# Patient Record
Sex: FEMALE | Race: WHITE | Employment: FULL TIME | URBAN - METROPOLITAN AREA
[De-identification: names, ages, dates, MRNs, and addresses within clinical notes are randomized per-mention and may not be internally consistent; named-entity substitution may affect disease eponyms.]

---

## 2017-02-22 ENCOUNTER — MYC REFILL (OUTPATIENT)
Dept: FAMILY MEDICINE | Facility: CLINIC | Age: 27
End: 2017-02-22

## 2017-02-22 DIAGNOSIS — Z79.899 CONTROLLED SUBSTANCE AGREEMENT SIGNED: ICD-10-CM

## 2017-02-22 DIAGNOSIS — F98.8 ATTENTION DEFICIT DISORDER: ICD-10-CM

## 2017-02-22 NOTE — TELEPHONE ENCOUNTER
Message from [facility]:  Original authorizing provider: ROMY Larsen CNP would like a refill of the following medications:  methylphenidate ER (CONCERTA) 27 MG CR tablet [ROMY Larsen CNP]    Preferred pharmacy: [facility] [place]  [place] - [address]    Comment:  Hi - I will be home in [place] Friday 2/24 through Tuesday 2/28. I am requesting for a refill for my methylphenidate so I can pick it up (hoping Monday 2/27). This is a prescription that Julianne Samayoa has to approve before it can be refilled. Let me know if this is possible and if you have any questions. Thank you! Yancy

## 2017-02-22 NOTE — TELEPHONE ENCOUNTER
Controlled Substance Refill Request for methylphenidate ER 27 mg daily  Problem List Complete:  No     PROVIDER TO CONSIDER COMPLETION OF PROBLEM LIST AND OVERVIEW/CONTROLLED SUBSTANCE AGREEMENT    Last Written Prescription Date:  12/15/16  Last Fill Quantity: 30,   # refills: 0    Last Office Visit with [facility] care provider: 6/23/16    Future Office visit:     Controlled substance agreement on file: Yes:  Date 12/17/15.     Processing:  Staff will hand deliver Rx to on-site pharmacy. Please notify the patient through My Chart when delivered to [facility].   checked in past 6 months?  No, route to RN   [place] reviewed:  reviewed- no concerns. Place in Hailo's basket.    [place]  [email or web address]

## 2017-02-23 RX ORDER — METHYLPHENIDATE HYDROCHLORIDE 27 MG/1
27 TABLET ORAL EVERY MORNING
Qty: 30 TABLET | Refills: 0 | Status: SHIPPED | OUTPATIENT
Start: 2017-02-23 | End: 2017-05-01

## 2017-03-27 DIAGNOSIS — E03.8 OTHER SPECIFIED HYPOTHYROIDISM: ICD-10-CM

## 2017-03-28 RX ORDER — LEVOTHYROXINE SODIUM 150 UG/1
TABLET ORAL
Qty: 90 TABLET | Refills: 1 | Status: SHIPPED | OUTPATIENT
Start: 2017-03-28 | End: 2017-09-06

## 2017-03-28 RX ORDER — NORGESTIMATE AND ETHINYL ESTRADIOL 7DAYSX3 28
KIT ORAL
Qty: 84 TABLET | Refills: 1 | Status: SHIPPED | OUTPATIENT
Start: 2017-03-28 | End: 2017-08-28

## 2017-03-28 NOTE — TELEPHONE ENCOUNTER
Refill approved through [facility] protocol.  Ilene Syed RN  [facility]  [phone]

## 2017-03-28 NOTE — TELEPHONE ENCOUNTER
LEVOTHYROXINE 0.150MG (150MCG) TAB     Last Written Prescription Date: 06/23/2016  Last Quantity: 90, # refills: 3  Last Office Visit with [facility] or [facility] prescribing provider: 06/23/2016        TSH   Date Value Ref Range Status   06/23/2016 3.65 0.40 - 4.00 mU/L Final             TRI-SPRINTEC TABLETS 28      Last Written Prescription Date: 10/17/2016  Last Fill Quantity: 84, # refills: 1  Last Office Visit with [facility], [facility] or [facility] prescribing provider: 06/23/2016       BP Readings from Last 3 Encounters:   06/23/16 101/68   07/01/15 93/53   09/22/14 111/73     Date of last Breast Exam:

## 2017-05-01 ENCOUNTER — MYC REFILL (OUTPATIENT)
Dept: FAMILY MEDICINE | Facility: CLINIC | Age: 27
End: 2017-05-01

## 2017-05-01 DIAGNOSIS — Z79.899 CONTROLLED SUBSTANCE AGREEMENT SIGNED: ICD-10-CM

## 2017-05-01 DIAGNOSIS — F98.8 ATTENTION DEFICIT DISORDER: ICD-10-CM

## 2017-05-01 RX ORDER — METHYLPHENIDATE HYDROCHLORIDE 27 MG/1
27 TABLET ORAL EVERY MORNING
Qty: 30 TABLET | Refills: 0 | Status: SHIPPED | OUTPATIENT
Start: 2017-05-01 | End: 2017-06-29

## 2017-05-01 NOTE — TELEPHONE ENCOUNTER
Controlled Substance Refill Request for Concerta 27 mg  Problem List Complete:  No     PROVIDER TO CONSIDER COMPLETION OF PROBLEM LIST AND OVERVIEW/CONTROLLED SUBSTANCE AGREEMENT    Last Written Prescription Date:  2/23/17  Last Fill Quantity: 30,   # refills: 0    Last Office Visit with [facility] primary care provider: 6/23/16    Future Office visit:     Controlled substance agreement on file: Yes:  Date 12/17/15.     Processing:  Staff will hand deliver Rx to on-site pharmacy   checked in past 6 months?  Yes 2/22/17   Analisa Petty RN

## 2017-05-01 NOTE — TELEPHONE ENCOUNTER
Message from [facility]:  Original authorizing provider: ROMY Larsen CNP would like a refill of the following medications:  methylphenidate ER (CONCERTA) 27 MG CR tablet [ROMY Larsen CNP]    Preferred pharmacy: [place] [id number]    Comment:  Hi, I would like to order/submit a request for a refill for this medication (methylphenidate ER 27 MG CR Tablet). Would like to pick it up at the [facility]. I believe Julianne Samayoa has to send it through. Let me know if you have any questions. Thank you, Yancy

## 2017-06-29 ENCOUNTER — OFFICE VISIT (OUTPATIENT)
Dept: FAMILY MEDICINE | Facility: CLINIC | Age: 27
End: 2017-06-29
Payer: COMMERCIAL

## 2017-06-29 VITALS
RESPIRATION RATE: 14 BRPM | HEART RATE: 71 BPM | BODY MASS INDEX: 22.58 KG/M2 | OXYGEN SATURATION: 100 % | DIASTOLIC BLOOD PRESSURE: 70 MMHG | HEIGHT: 68 IN | WEIGHT: 149 LBS | TEMPERATURE: 98.2 F | SYSTOLIC BLOOD PRESSURE: 105 MMHG

## 2017-06-29 DIAGNOSIS — E03.9 ACQUIRED HYPOTHYROIDISM: ICD-10-CM

## 2017-06-29 DIAGNOSIS — Z79.899 CONTROLLED SUBSTANCE AGREEMENT SIGNED: ICD-10-CM

## 2017-06-29 DIAGNOSIS — Z30.41 ORAL CONTRACEPTIVE PILL SURVEILLANCE: ICD-10-CM

## 2017-06-29 DIAGNOSIS — Z00.00 ENCOUNTER FOR ROUTINE ADULT HEALTH EXAMINATION WITHOUT ABNORMAL FINDINGS: Primary | ICD-10-CM

## 2017-06-29 DIAGNOSIS — F98.8 ATTENTION DEFICIT DISORDER (ADD) WITHOUT HYPERACTIVITY: ICD-10-CM

## 2017-06-29 LAB — TSH SERPL DL<=0.005 MIU/L-ACNC: 3.2 MU/L (ref 0.4–4)

## 2017-06-29 PROCEDURE — 99395 PREV VISIT EST AGE 18-39: CPT | Performed by: NURSE PRACTITIONER

## 2017-06-29 PROCEDURE — 84443 ASSAY THYROID STIM HORMONE: CPT | Performed by: NURSE PRACTITIONER

## 2017-06-29 PROCEDURE — 36415 COLL VENOUS BLD VENIPUNCTURE: CPT | Performed by: NURSE PRACTITIONER

## 2017-06-29 RX ORDER — TRETINOIN 0.25 MG/G
CREAM TOPICAL
Refills: 1 | COMMUNITY
Start: 2016-12-01

## 2017-06-29 RX ORDER — METHYLPHENIDATE HYDROCHLORIDE 27 MG/1
27 TABLET ORAL EVERY MORNING
Qty: 30 TABLET | Refills: 0 | Status: SHIPPED | OUTPATIENT
Start: 2017-08-25 | End: 2019-07-10

## 2017-06-29 NOTE — NURSING NOTE
"Chief Complaint   Patient presents with     Physical       Initial /70 (Cuff Size: Adult Regular)  Pulse 71  Temp 98.2  F (36.8  C) (Tympanic)  Resp 14  Ht 5' 8\" (1.727 m)  Wt 149 lb (67.6 kg)  LMP 06/27/2017 (Exact Date)  SpO2 100%  BMI 22.66 kg/m2 Estimated body mass index is 22.66 kg/(m^2) as calculated from the following:    Height as of this encounter: 5' 8\" (1.727 m).    Weight as of this encounter: 149 lb (67.6 kg).  Medication Reconciliation: complete   Tess Lowe, SANDRA    "

## 2017-06-29 NOTE — MR AVS SNAPSHOT
"              After Visit Summary   6/29/2017    Yancy Palumbo    MRN: [id number]           Patient Information     Date Of Birth          1990        Visit Information        Provider Department      6/29/2017 9:15 AM Julianne Sam[facility]oa APRN [facility]        Today's Diagnoses     Acquired hypothyroidism    -  1    Attention deficit disorder (ADD) without hyperactivity        Controlled substance agreement signed           Follow-ups after your visit        Who to contact     If you have questions or need follow up information about today's clinic visit or your schedule please contact [facility] directly at [phone].  Normal or non-critical lab and imaging results will be communicated to you by MyChart, letter or phone within 4 business days after the clinic has received the results. If you do not hear from us within 7 days, please contact the clinic through [employer] or phone. If you have a critical or abnormal lab result, we will notify you by phone as soon as possible.  Submit refill requests through [employer] or call your pharmacy and they will forward the refill request to us. Please allow 3 business days for your refill to be completed.          Additional Information About Your Visit        MyChart Information     [employer] gives you secure access to your electronic health record. If you see a primary care provider, you can also send messages to your care team and make appointments. If you have questions, please call your primary care clinic.  If you do not have a primary care provider, please call [phone] and they will assist you.        Care EveryWhere ID     This is your Care EveryWhere ID. This could be used by other organizations to access your [place] medical records  [id number]        Your Vitals Were     Pulse Temperature Respirations Height Last Period Pulse Oximetry    71 98.2  F (36.8  C) (Tympanic) 14 5' 8\" (1.727 m) 06/27/2017 (Exact Date) 100%    " BMI (Body Mass Index)                   22.66 kg/m2            Blood Pressure from Last 3 Encounters:   06/29/17 105/70   06/23/16 101/68   07/01/15 93/53    Weight from Last 3 Encounters:   06/29/17 149 lb (67.6 kg)   06/23/16 144 lb (65.3 kg)   07/01/15 144 lb (65.3 kg)              We Performed the Following     TSH WITH FREE T4 REFLEX          Where to get your medicines      Some of these will need a paper prescription and others can be bought over the counter.  Ask your nurse if you have questions.     Bring a paper prescription for each of these medications     methylphenidate ER 27 MG CR tablet          Primary Care Provider Office Phone # Fax #    Julianne Samayoa APRFLORIN [phone]       [address]  [place] [address]        Equal Access to Services     TONI SARABIA : Hadii dwain Dukes, waaxvijay lufernando, qaybta kaalmavijay flanagan, jared davis . So [facility] [phone].    ATENCIÓN: Si habla español, tiene a verdin disposición servicios gratuitos de asistencia lingüística. Llame al [phone].    We comply with applicable federal civil rights laws and [place] laws. We do not discriminate on the basis of race, color, national origin, age, disability sex, sexual orientation or gender identity.            Thank you!     Thank you for choosing [facility] [place]  for your care. Our goal is always to provide you with excellent care. Hearing back from our patients is one way we can continue to improve our services. Please take a few minutes to complete the written survey that you may receive in the mail after your visit with us. Thank you!             Your Updated Medication List - Protect others around you: Learn how to safely use, store and throw away your medicines at www.disposemymeds.org.          This list is accurate as of: 6/29/17 10:18 AM.  Always use your most recent med list.                   Brand Name  Dispense Instructions for use Diagnosis    BIOTIN PO      Take by mouth daily        clindamycin 1 % solution    CLEOCIN T    60 mL    Apply topically 2 times daily    Other acne       levothyroxine 150 MCG tablet    SYNTHROID/LEVOTHROID    90 tablet    TAKE 1 TABLET BY MOUTH DAILY    Other specified hypothyroidism       methylphenidate ER 27 MG CR tablet   Start taking on:  8/25/2017    CONCERTA    30 tablet    Take 1 tablet (27 mg) by mouth every morning Wayne WOODY    Attention deficit disorder (ADD) without hyperactivity, Controlled substance agreement signed       OMEGA-3 FISH OIL PO      Take by mouth daily        tretinoin 0.025 % cream    RETIN-A     ISIDRO AA IN THE EVENING        TRI-SPRINTEC 0.18/0.215/0.25 MG-35 MCG per tablet   Generic drug:  norgestim-eth estrad triphasic     84 tablet    TAKE 1 TABLET BY MOUTH EVERY DAY    Contraception       VITAMIN D3 PO      Take by mouth daily

## 2017-06-29 NOTE — PROGRESS NOTES
SUBJECTIVE:   CC: Yancy Palumbo is an 27 year old woman who presents for preventive health visit.     Physical   Annual:     Getting at least 3 servings of Calcium per day::  Yes    Bi-annual eye exam::  NO    Dental care twice a year::  Yes    Sleep apnea or symptoms of sleep apnea::  None    Diet::  Regular (no restrictions)    Frequency of exercise::  4-5 days/week    Duration of exercise::  30-45 minutes    Taking medications regularly::  Yes    Medication side effects::  Not applicable    Additional concerns today::  YES           Today's PHQ-2 Score:   PHQ-2 ( 1999 [employer]) 6/27/2017   Q1: Little interest or pleasure in doing things 0   Q2: Feeling down, depressed or hopeless 0   PHQ-2 Score 0   Q1: Little interest or pleasure in doing things Not at all   Q2: Feeling down, depressed or hopeless Not at all   PHQ-2 Score 0       Abuse: Current or Past(Physical, Sexual or Emotional)- No  Do you feel safe in your environment - Yes    Social History   Substance Use Topics     Smoking status: Never Smoker     Smokeless tobacco: Never Used     Alcohol use No      Comment: Quit end of July 2011     The patient does not drink >3 drinks per day nor >7 drinks per week.    Reviewed orders with patient.  Reviewed health maintenance and updated orders accordingly - Yes       History of abnormal Pap smear: NO - age 21-29 PAP every 3 years recommended    Reviewed and updated as needed this visit by clinical staff         Reviewed and updated as needed this visit by Provider        Past Medical History:   Diagnosis Date     ALCOHOL ABUSE, EPISODIC 8/2011    taken to detox for intoxication     Attention deficit disorder without mention of hyperactivity      Generalized anxiety disorder 2004    resolved 2015     Hypothyroidism 2000    age 10      Infectious mononucleosis 2009     Major depressive disorder, single episode in full remission (H)     resolved 2015      Past Surgical History:   Procedure Laterality Date     NO  "HISTORY OF SURGERY         Social Hx:  Lives with BF in [place]. 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  Home visiting parents.     Contraception: BCP     ROS:  C: NEGATIVE for fever, chills, change in weight  I: NEGATIVE for worrisome rashes, moles or lesions. Sees dermatologist for acne. Uses topicals prn, none current   E: NEGATIVE for vision changes or irritation  ENT: NEGATIVE for ear, mouth and throat problems  R: NEGATIVE for significant cough or SOB  B: NEGATIVE for masses, tenderness or discharge  CV: NEGATIVE for chest pain, palpitations or peripheral edema  GI: NEGATIVE for nausea, abdominal pain, heartburn, or change in bowel habits  : NEGATIVE for unusual urinary or vaginal symptoms. Periods are regular.   M: NEGATIVE for significant arthralgias or myalgia  N: NEGATIVE for weakness, dizziness or paresthesias  P: NEGATIVE for changes in mood or affect. Takes Concerta for ADD. Considering weaning from this.  Doesn't seem to need as much.      OBJECTIVE:   /70 (Cuff Size: Adult Regular)  Pulse 71  Temp 98.2  F (36.8  C) (Tympanic)  Resp 14  Ht 5' 8\" (1.727 m)  Wt 149 lb (67.6 kg)  LMP 06/27/2017 (Exact Date)  SpO2 100%  BMI 22.66 kg/m2  EXAM:  GENERAL APPEARANCE: healthy, alert and no distress  EYES: Eyes grossly normal to inspection, PERRL and conjunctivae and sclerae normal  HENT: ear canals and TM's normal, nose and mouth without ulcers or lesions, oropharynx clear and oral mucous membranes moist  NECK: no adenopathy, no asymmetry, masses, or scars and thyroid normal to palpation  RESP: lungs clear to auscultation - no rales, rhonchi or wheezes  BREAST: normal without masses, tenderness or nipple discharge and no palpable axillary masses or adenopathy  CV: regular rate and rhythm, normal S1 S2, no S3 or S4, no murmur, click or rub, no peripheral edema and peripheral pulses strong  ABDOMEN: soft, nontender, no hepatosplenomegaly, no masses and bowel sounds normal  MS: no musculoskeletal defects " "are noted and gait is age appropriate without ataxia  SKIN: no suspicious lesions or rashes  NEURO: Normal strength and tone, sensory exam grossly normal, mentation intact and speech normal  PSYCH: mentation appears normal and affect normal/bright    ASSESSMENT/PLAN:   Yancy was seen today for physical.    Diagnoses and all orders for this visit:    Encounter for routine adult health examination without abnormal findings    Acquired hypothyroidism  -     TSH WITH FREE T4 REFLEX    Oral contraceptive pill surveillance    Attention deficit disorder (ADD) without hyperactivity  -     methylphenidate ER (CONCERTA) 27 MG CR tablet; Take 1 tablet (27 mg) by mouth every morning [place] brand OK    Controlled substance agreement signed  -     methylphenidate ER (CONCERTA) 27 MG CR tablet; Take 1 tablet (27 mg) by mouth every morning [place] brand [place]        COUNSELING:  Reviewed preventive health counseling, as reflected in patient instructions  Special attention given to:        Regular exercise       Healthy diet/nutrition       Vision screening       Contraception         reports that she has never smoked. She has never used smokeless tobacco.    Estimated body mass index is 21.9 kg/(m^2) as calculated from the following:    Height as of 6/23/16: 5' 8\" (1.727 m).    Weight as of 6/23/16: 144 lb (65.3 kg).       Counseling Resources:  ATP IV Guidelines  Pooled Cohorts Equation Calculator  Breast Cancer Risk Calculator  FRAX Risk Assessment  ICSI Preventive Guidelines  Dietary Guidelines for Americans, 2010  USDA's MyPlate  ASA Prophylaxis  Lung CA Screening    ROMY Larsen [facility] [place]  Answers for HPI/ROS submitted by the patient on 6/27/2017   PHQ-2 Score: 0    "

## 2017-08-28 RX ORDER — NORGESTIMATE AND ETHINYL ESTRADIOL 7DAYSX3 28
KIT ORAL
Qty: 84 TABLET | Refills: 0 | Status: SHIPPED | OUTPATIENT
Start: 2017-08-28 | End: 2017-11-02

## 2017-08-28 NOTE — TELEPHONE ENCOUNTER
Prescription approved per [facility], [facility] or [facility] refill protocol.  Sis Teixeira RN - Triage  [facility]

## 2017-08-28 NOTE — TELEPHONE ENCOUNTER
Tri-Ssprintec      Last Written Prescription Date: 3/28/17  Last Fill Quantity: 84,  # refills: 1   Last Pap - 7/1/15   Last Office Visit with [facility], [facility] or [facility] prescribing provider: 6/29/17                                             JUANA Solis LPN

## 2017-09-06 DIAGNOSIS — E03.8 OTHER SPECIFIED HYPOTHYROIDISM: ICD-10-CM

## 2017-09-06 RX ORDER — LEVOTHYROXINE SODIUM 150 UG/1
TABLET ORAL
Qty: 90 TABLET | Refills: 0 | Status: SHIPPED | OUTPATIENT
Start: 2017-09-06 | End: 2017-12-29

## 2017-09-06 NOTE — TELEPHONE ENCOUNTER
Prescription approved per [facility], [facility] or [facility] refill protocol.  Sis Teixeira RN - Triage  [facility]

## 2017-09-06 NOTE — TELEPHONE ENCOUNTER
Synthroid      Last Written Prescription Date: 3/28/17  Last Quantity: 90, # refills: 1  Last Office Visit with [facility] [place] or [facility] prescribing provider: 6/29/17        TSH   Date Value Ref Range Status   06/29/2017 3.20 0.40 - 4.00 mU/L Final

## 2017-11-02 RX ORDER — NORGESTIMATE AND ETHINYL ESTRADIOL 7DAYSX3 28
KIT ORAL
Qty: 84 TABLET | Refills: 1 | Status: SHIPPED | OUTPATIENT
Start: 2017-11-02 | End: 2018-04-12

## 2017-11-02 NOTE — TELEPHONE ENCOUNTER
Refill approved through [facility] protocol.  Ilene Syed RN  [facility]  [phone]

## 2017-11-22 DIAGNOSIS — E03.8 OTHER SPECIFIED HYPOTHYROIDISM: ICD-10-CM

## 2017-11-22 RX ORDER — LEVOTHYROXINE SODIUM 150 UG/1
TABLET ORAL
Qty: 90 TABLET | Refills: 0 | OUTPATIENT
Start: 2017-11-22

## 2017-11-22 NOTE — TELEPHONE ENCOUNTER
patient needs appointment to establish care with new pcp.  Has medication until December  Sis Teixeira RN - Triage  [facility]

## 2017-12-29 ENCOUNTER — MYC REFILL (OUTPATIENT)
Dept: FAMILY MEDICINE | Facility: CLINIC | Age: 27
End: 2017-12-29

## 2017-12-29 DIAGNOSIS — E03.8 OTHER SPECIFIED HYPOTHYROIDISM: ICD-10-CM

## 2017-12-29 NOTE — TELEPHONE ENCOUNTER
Message from [facility]:  Original authorizing provider: Timbo Gabriel MD    Yancy Palumbo would like a refill of the following medications:  levothyroxine (SYNTHROID/LEVOTHROID) 150 MCG tablet [Timbo Gabriel MD]    Preferred pharmacy: [facility] [place] [place] - [address]    Comment:  Hi - I get my prescriptions refilled at [facility] in my [place], [place] - my TRI-SPRINTEC is still available for refills but for some reason my levothyroxine is not. I had blood work in the summer of 2017 so I'm not sure why there are no refills available. Can you please allow me to refill the TRI-SPRINTEC prescription? I only have a few pills left in my current bottle. Here is more information on the [facility] I  my meds from: Phone #: [phone] Address: [address] [place] [address]

## 2017-12-29 NOTE — TELEPHONE ENCOUNTER
Requested Prescriptions   Pending Prescriptions Disp Refills     levothyroxine (SYNTHROID/LEVOTHROID) 150 MCG tablet  Last Written Prescription Date:  9/6/17  Last Fill Quantity: 90,  # refills: 0   Last Office Visit with [facility] or [facility] prescribing provider:  6/29/17   Future Office Visit:      90 tablet 0     Sig: Take 1 tablet (150 mcg) by mouth daily    Thyroid Protocol Passed    12/29/2017 10:21 AM       Passed - Patient is 12 years or older       Passed - Recent or future visit with authorizing provider's specialty    Patient had office visit in the last year or has a visit in the next 30 days with authorizing provider.  See chart review.              Passed - Normal TSH on file in past 12 months    Recent Labs   Lab Test  06/29/17   1021   TSH  3.20             Passed - No active pregnancy on record    If patient is pregnant or has had a positive pregnancy test, please check TSH.         Passed - No positive pregnancy test in past 12 months    If patient is pregnant or has had a positive pregnancy test, please check TSH.

## 2017-12-31 DIAGNOSIS — E03.8 OTHER SPECIFIED HYPOTHYROIDISM: ICD-10-CM

## 2018-01-02 RX ORDER — LEVOTHYROXINE SODIUM 150 UG/1
150 TABLET ORAL DAILY
Qty: 90 TABLET | Refills: 1 | Status: SHIPPED | OUTPATIENT
Start: 2018-01-02 | End: 2018-05-29

## 2018-01-02 NOTE — TELEPHONE ENCOUNTER
Requested Prescriptions   Pending Prescriptions Disp Refills     levothyroxine (SYNTHROID/LEVOTHROID) 150 MCG tablet [Pharmacy Med Name: LEVOTHYROXINE 0.150MG (150MCG) TAB] 90 tablet 0    Last Written Prescription Date:  1/2/18  Last Fill Quantity: 90,  # refills: 1   Last Office Visit with [facility] or [facility] prescribing provider:  6/29/17   Future Office Visit:      Sig: TAKE 1 TABLET BY MOUTH EVERY DAY    Thyroid Protocol Passed    12/31/2017 11:20 AM       Passed - Patient is 12 years or older       Passed - Recent or future visit with authorizing provider's specialty    Patient had office visit in the last year or has a visit in the next 30 days with authorizing provider.  See chart review.              Passed - Normal TSH on file in past 12 months    Recent Labs   Lab Test  06/29/17   1021   TSH  3.20             Passed - No active pregnancy on record    If patient is pregnant or has had a positive pregnancy test, please check TSH.         Passed - No positive pregnancy test in past 12 months    If patient is pregnant or has had a positive pregnancy test, please check TSH.

## 2018-01-03 RX ORDER — LEVOTHYROXINE SODIUM 150 UG/1
TABLET ORAL
Qty: 90 TABLET | Refills: 0 | OUTPATIENT
Start: 2018-01-03

## 2018-01-03 NOTE — TELEPHONE ENCOUNTER
Patient has refills remaining with pharmacy.  Sis Teixeira RN - Triage  [facility]

## 2018-04-02 RX ORDER — NORGESTIMATE AND ETHINYL ESTRADIOL 7DAYSX3 28
KIT ORAL
Qty: 84 TABLET | Refills: 0 | OUTPATIENT
Start: 2018-04-02

## 2018-04-02 NOTE — TELEPHONE ENCOUNTER
Patient has refills through May of medication. She has not est care with new provider. Needs to est care in order for continued refills. Rx denied. Routing to team to inform and assist in scheduling.   Juhi Mart RN   [facility] - Triage

## 2018-04-02 NOTE — TELEPHONE ENCOUNTER
"Requested Prescriptions   Pending Prescriptions Disp Refills     TRI-SPRINTEC 0.18/0.215/0.25 MG-35 MCG per tablet [Pharmacy Med Name: TRI-SPRINTEC TABLETS 28] 84 tablet 0     Sig: TAKE 1 TABLET BY MOUTH EVERY DAY    Contraceptives Protocol Passed    4/2/2018  8:15 AM       Passed - Patient is not a current smoker if age is 35 or older       Passed - Recent (12 mo) or future (30 days) visit within the authorizing provider's specialty    Patient had office visit in the last 12 months or has a visit in the next 30 days with authorizing provider or within the authorizing provider's specialty.  See \"Patient Info\" tab in inbasket, or \"Choose Columns\" in Meds & Orders section of the refill encounter.           Passed - No active pregnancy on record       Passed - No positive pregnancy test in past 12 months        TRI-SPRINTEC 0.18/0.215/0.25 MG-35 MCG per tablet 84 tablet 1 11/2/2017       Last Written Prescription Date:  11/02/2017  Last Fill Quantity: 84,  # refills: 1   Last office visit: 6/29/2017 with prescribing provider:  Dr. Samayoa    Future Office Visit:  Unknown     "

## 2018-04-05 NOTE — TELEPHONE ENCOUNTER
See patient's response  She was advised to schedule as soon as she comes home  Told her to contact her pharmacy re: refills    Thanks for getting back to me.     The only problem is I am living in [place] right now (my family still lives in [place] and I visit home a handful of times a year and like to still go to my doctor in [place]) - I should be home in June or July next - when I get a flight booked, I will schedule an appointment with the doctor for a visit in June or July (I usually go in at that time to get blood work for my thyroid condition, etc. - kind of like a physicial).  Will this timing still work?  Can I still get my prescriptions refilled in the meantime?  Luckily, the Tri-Sprintec is not urgent right now - I still have one pack that I start on Sunday, but would be good to refill the prescription some time soon (maybe when she is back later this month if possible?).     Thank you and please let me know if there's any other information you need from me!       Yancy Fairchild TC

## 2018-04-13 RX ORDER — NORGESTIMATE AND ETHINYL ESTRADIOL 7DAYSX3 28
KIT ORAL
Qty: 84 TABLET | Refills: 0 | Status: SHIPPED | OUTPATIENT
Start: 2018-04-13 | End: 2018-07-02

## 2018-04-13 NOTE — TELEPHONE ENCOUNTER
Prescription approved per [facility] Refill Protocol.  Due for physical in June 2018.    Charline Whittington, INGRID, RN, PHN  [facility]) [phone]

## 2018-04-13 NOTE — TELEPHONE ENCOUNTER
"Requested Prescriptions   Pending Prescriptions Disp Refills     TRI-SPRINTEC 0.18/0.215/0.25 MG-35 MCG per tablet [Pharmacy Med Name: TRI-SPRINTEC TABLETS 28]  Last Written Prescription Date:  11/2/17  Last Fill Quantity: 84,  # refills: 1   Last office visit: 6/29/2017 with prescribing provider:  Yao   Future Office Visit:     84 tablet 0     Sig: TAKE 1 TABLET BY MOUTH EVERY DAY    Contraceptives Protocol Passed    4/12/2018  6:42 PM       Passed - Patient is not a current smoker if age is 35 or older       Passed - Recent (12 mo) or future (30 days) visit within the authorizing provider's specialty    Patient had office visit in the last 12 months or has a visit in the next 30 days with authorizing provider or within the authorizing provider's specialty.  See \"Patient Info\" tab in inbasket, or \"Choose Columns\" in Meds & Orders section of the refill encounter.           Passed - No active pregnancy on record       Passed - No positive pregnancy test in past 12 months          "

## 2018-05-29 DIAGNOSIS — E03.8 OTHER SPECIFIED HYPOTHYROIDISM: ICD-10-CM

## 2018-05-29 RX ORDER — LEVOTHYROXINE SODIUM 150 UG/1
TABLET ORAL
Qty: 90 TABLET | Refills: 0 | Status: SHIPPED | OUTPATIENT
Start: 2018-05-29 | End: 2018-07-11

## 2018-05-29 NOTE — TELEPHONE ENCOUNTER
Refill approved through [facility] protocol.  Has appointment scheduled with pcp.  Ilene Syed RN  [facility]  [phone]

## 2018-05-29 NOTE — TELEPHONE ENCOUNTER
"Requested Prescriptions   Pending Prescriptions Disp Refills     levothyroxine (SYNTHROID/LEVOTHROID) 150 MCG tablet [Pharmacy Med Name: LEVOTHYROXINE 0.150MG (150MCG) TAB] 90 tablet 0    Last Written Prescription Date:  1/2/18  Last Fill Quantity: 90,  # refills: 1   Last office visit: 6/29/2017 with prescribing provider:  NO   Future Office Visit:   Next 5 appointments (look out 90 days)     Jul 11, 2018  9:00 AM CDT   Vania Kovacs with Timbo Gabriel [place]   [facility] [place] [address]   [phone]                  Sig: TAKE 1 TABLET(150 MCG) BY MOUTH DAILY    Thyroid Protocol Passed    5/29/2018  9:00 AM       Passed - Patient is 12 years or older       Passed - Recent (12 mo) or future (30 days) visit within the authorizing provider's specialty    Patient had office visit in the last 12 months or has a visit in the next 30 days with authorizing provider or within the authorizing provider's specialty.  See \"Patient Info\" tab in inbasket, or \"Choose Columns\" in Meds & Orders section of the refill encounter.           Passed - Normal TSH on file in past 12 months    Recent Labs   Lab Test  06/29/17   1021   TSH  3.20             Passed - No active pregnancy on record    If patient is pregnant or has had a positive pregnancy test, please check TSH.         Passed - No positive pregnancy test in past 12 months    If patient is pregnant or has had a positive pregnancy test, please check TSH.            "

## 2018-07-02 RX ORDER — NORGESTIMATE AND ETHINYL ESTRADIOL 7DAYSX3 28
KIT ORAL
Qty: 28 TABLET | Refills: 0 | Status: SHIPPED | OUTPATIENT
Start: 2018-07-02 | End: 2018-07-11

## 2018-07-02 NOTE — TELEPHONE ENCOUNTER
Pt is scheduled 7/11/18.    Medication is being filled for 1 time refill only due to:  Patient needs to be seen because it has been more than one year since last visit.   Marielos Conner RN  [facility]

## 2018-07-02 NOTE — TELEPHONE ENCOUNTER
"Requested Prescriptions   Pending Prescriptions Disp Refills     TRI-SPRINTEC 0.18/0.215/0.25 MG-35 MCG per tablet [Pharmacy Med Name: TRI-SPRINTEC TABLETS 28]  Last Written Prescription Date:  4-  Last Fill Quantity: 84 tablet,  # refills: 0   Last office visit: 6/29/2017 with prescribing provider:  6-  Future Office Visit:   Next 5 appointments (look out 90 days)     Jul 11, 2018  9:00 AM CDT   Vania Kovacs with Timbo Gabriel MD   [facility] [place] ([address] [id number] [phone]                  84 tablet 0     Sig: TAKE 1 TABLET BY MOUTH EVERY DAY    Contraceptives Protocol Passed    7/2/2018  9:06 AM       Passed - Patient is not a current smoker if age is 35 or older       Passed - Recent (12 mo) or future (30 days) visit within the authorizing provider's specialty    Patient had office visit in the last 12 months or has a visit in the next 30 days with authorizing provider or within the authorizing provider's specialty.  See \"Patient Info\" tab in inbasket, or \"Choose Columns\" in Meds & Orders section of the refill encounter.           Passed - No active pregnancy on record       Passed - No positive pregnancy test in past 12 months          "

## 2018-07-11 ENCOUNTER — OFFICE VISIT (OUTPATIENT)
Dept: FAMILY MEDICINE | Facility: CLINIC | Age: 28
End: 2018-07-11
Payer: COMMERCIAL

## 2018-07-11 VITALS
BODY MASS INDEX: 23.23 KG/M2 | DIASTOLIC BLOOD PRESSURE: 60 MMHG | TEMPERATURE: 98.9 F | WEIGHT: 148 LBS | SYSTOLIC BLOOD PRESSURE: 100 MMHG | HEIGHT: 67 IN | HEART RATE: 72 BPM

## 2018-07-11 DIAGNOSIS — Z30.41 ORAL CONTRACEPTIVE PILL SURVEILLANCE: ICD-10-CM

## 2018-07-11 DIAGNOSIS — Z13.6 CARDIOVASCULAR SCREENING; LDL GOAL LESS THAN 160: ICD-10-CM

## 2018-07-11 DIAGNOSIS — Z12.4 SCREENING FOR MALIGNANT NEOPLASM OF CERVIX: Primary | ICD-10-CM

## 2018-07-11 DIAGNOSIS — E03.8 OTHER SPECIFIED HYPOTHYROIDISM: ICD-10-CM

## 2018-07-11 DIAGNOSIS — Z00.00 ENCOUNTER FOR ANNUAL PHYSICAL EXAM: ICD-10-CM

## 2018-07-11 LAB
ALBUMIN SERPL-MCNC: 3.5 G/DL (ref 3.4–5)
ALP SERPL-CCNC: 47 U/L (ref 40–150)
ALT SERPL W P-5'-P-CCNC: 15 U/L (ref 0–50)
ANION GAP SERPL CALCULATED.3IONS-SCNC: 9 MMOL/L (ref 3–14)
AST SERPL W P-5'-P-CCNC: 11 U/L (ref 0–45)
BILIRUB SERPL-MCNC: 0.4 MG/DL (ref 0.2–1.3)
BUN SERPL-MCNC: 10 MG/DL (ref 7–30)
CALCIUM SERPL-MCNC: 8.5 MG/DL (ref 8.5–10.1)
CHLORIDE SERPL-SCNC: 107 MMOL/L (ref 94–109)
CHOLEST SERPL-MCNC: 171 MG/DL
CO2 SERPL-SCNC: 23 MMOL/L (ref 20–32)
CREAT SERPL-MCNC: 0.68 MG/DL (ref 0.52–1.04)
GFR SERPL CREATININE-BSD FRML MDRD: >90 ML/MIN/1.7M2
GLUCOSE SERPL-MCNC: 78 MG/DL (ref 70–99)
HDLC SERPL-MCNC: 62 MG/DL
LDLC SERPL CALC-MCNC: 93 MG/DL
NONHDLC SERPL-MCNC: 109 MG/DL
POTASSIUM SERPL-SCNC: 3.8 MMOL/L (ref 3.4–5.3)
PROT SERPL-MCNC: 6.9 G/DL (ref 6.8–8.8)
SODIUM SERPL-SCNC: 139 MMOL/L (ref 133–144)
TRIGL SERPL-MCNC: 81 MG/DL
TSH SERPL DL<=0.005 MIU/L-ACNC: 2.09 MU/L (ref 0.4–4)

## 2018-07-11 PROCEDURE — G0145 SCR C/V CYTO,THINLAYER,RESCR: HCPCS | Performed by: FAMILY MEDICINE

## 2018-07-11 PROCEDURE — 80053 COMPREHEN METABOLIC PANEL: CPT | Performed by: FAMILY MEDICINE

## 2018-07-11 PROCEDURE — 99395 PREV VISIT EST AGE 18-39: CPT | Performed by: FAMILY MEDICINE

## 2018-07-11 PROCEDURE — 36415 COLL VENOUS BLD VENIPUNCTURE: CPT | Performed by: FAMILY MEDICINE

## 2018-07-11 PROCEDURE — 80061 LIPID PANEL: CPT | Performed by: FAMILY MEDICINE

## 2018-07-11 PROCEDURE — 84443 ASSAY THYROID STIM HORMONE: CPT | Performed by: FAMILY MEDICINE

## 2018-07-11 RX ORDER — LEVOTHYROXINE SODIUM 150 UG/1
TABLET ORAL
Qty: 90 TABLET | Refills: 3 | Status: SHIPPED | OUTPATIENT
Start: 2018-07-11 | End: 2019-06-21

## 2018-07-11 RX ORDER — NORGESTIMATE AND ETHINYL ESTRADIOL 7DAYSX3 28
1 KIT ORAL DAILY
Qty: 90 TABLET | Refills: 3 | Status: SHIPPED | OUTPATIENT
Start: 2018-07-11 | End: 2019-07-10

## 2018-07-11 RX ORDER — LEVOTHYROXINE SODIUM 150 UG/1
TABLET ORAL
Qty: 90 TABLET | Refills: 3 | Status: CANCELLED | OUTPATIENT
Start: 2018-07-11

## 2018-07-11 NOTE — LETTER
July 12, 2018      Yancy Palumbo  [address]  [facility] [address]        Dear ,        I have reviewed your recent labs. Here are the results:     -Liver and gallbladder tests are normal. (ALT,AST, Alk phos, bilirubin), kidney function is normal (Cr, GFR), Sodium is normal, Potassium is normal, Calcium is normal, Glucose is normal (diabetes screening test).   -Cholesterol levels (LDL,HDL, Triglycerides) are normal.  ADVISE: rechecking in 1 year.   -TSH (thyroid stimulating hormone) level is normal which indicates appropriate thyroid replacement dosing.  ADVISE: continuing same replacement dose and recheck in 12 months (TSH w/ T4 reflex, DX: hypothyroidism.)   -Medication is sent to the pharmacy.     Resulted Orders   TSH WITH FREE T4 REFLEX   Result Value Ref Range    TSH 2.09 0.40 - 4.00 mU/L   Lipid panel reflex to direct LDL Fasting   Result Value Ref Range    Cholesterol 171 <200 mg/dL    Triglycerides 81 <150 mg/dL      Comment:      Fasting specimen    HDL Cholesterol 62 >49 mg/dL    LDL Cholesterol Calculated 93 <100 mg/dL      Comment:      Desirable:       <100 mg/dl    Non HDL Cholesterol 109 <130 mg/dL   Comprehensive metabolic panel   Result Value Ref Range    Sodium 139 133 - 144 mmol/L    Potassium 3.8 3.4 - 5.3 mmol/L    Chloride 107 94 - 109 mmol/L    Carbon Dioxide 23 20 - 32 mmol/L    Anion Gap 9 3 - 14 mmol/L    Glucose 78 70 - 99 mg/dL      Comment:      Fasting specimen    Urea Nitrogen 10 7 - 30 mg/dL    Creatinine 0.68 0.52 - 1.04 mg/dL    GFR Estimate >90 >60 mL/min/1.7m2      Comment:      Non  GFR Calc    GFR Estimate If Black >90 >60 mL/min/1.7m2      Comment:       GFR Calc    Calcium 8.5 8.5 - 10.1 mg/dL    Bilirubin Total 0.4 0.2 - 1.3 mg/dL    Albumin 3.5 3.4 - 5.0 g/dL    Protein Total 6.9 6.8 - 8.8 g/dL    Alkaline Phosphatase 47 40 - 150 U/L    ALT 15 0 - 50 U/L    AST 11 0 - 45 U/L       If you have any questions or concerns, please  call the clinic at the number listed above.       Sincerely,        Timbo Gabriel MD

## 2018-07-11 NOTE — MR AVS SNAPSHOT
After Visit Summary   7/11/2018    Yancy Palumbo    MRN: [id number]           Patient Information     Date Of Birth          1990        Visit Information        Provider Department      7/11/2018 9:00 AM Timbo Gabriel MD [facility] [place]        Today's Diagnoses     Screening for malignant neoplasm of cervix    -  1    Encounter for annual physical exam        Other specified hypothyroidism        CARDIOVASCULAR SCREENING; LDL GOAL LESS THAN 160        Oral contraceptive pill surveillance          Care Instructions      Preventive Health Recommendations  Female Ages 26 - 39  Yearly exam:   See your health care provider every year in order to    Review health changes.     Discuss preventive care.      Review your medicines if you your doctor has prescribed any.    Until age 30: Get a Pap test every three years (more often if you have had an abnormal result).    After age 30: Talk to your doctor about whether you should have a Pap test every 3 years or have a Pap test with HPV screening every 5 years.   You do not need a Pap test if your uterus was removed (hysterectomy) and you have not had cancer.  You should be tested each year for STDs (sexually transmitted diseases), if you're at risk.   Talk to your provider about how often to have your cholesterol checked.  If you are at risk for diabetes, you should have a diabetes test (fasting glucose).  Shots: Get a flu shot each year. Get a tetanus shot every 10 years.   Nutrition:     Eat at least 5 servings of fruits and vegetables each day.    Eat whole-grain bread, whole-wheat pasta and brown rice instead of white grains and rice.    Get adequate Calcium and Vitamin D.     Lifestyle    Exercise at least 150 minutes a week (30 minutes a day, 5 days of the week). This will help you control your weight and prevent disease.    Limit alcohol to one drink per day.    No smoking.     Wear sunscreen to prevent skin cancer.    See your dentist  "every six months for an exam and cleaning.            Follow-ups after your visit        Follow-up notes from your care team     Return in about 1 year (around 7/11/2019) for Physical Exam.      Who to contact     If you have questions or need follow up information about today's clinic visit or your schedule please contact [facility] [place] directly at [phone].  Normal or non-critical lab and imaging results will be communicated to you by EVERYWAREhart, letter or phone within 4 business days after the clinic has received the results. If you do not hear from us within 7 days, please contact the clinic through [facility] or phone. If you have a critical or abnormal lab result, we will notify you by phone as soon as possible.  Submit refill requests through [employer] or call your pharmacy and they will forward the refill request to us. Please allow 3 business days for your refill to be completed.          Additional Information About Your Visit        EVERYWAREhart Information     MyFit gives you secure access to your electronic health record. If you see a primary care provider, you can also send messages to your care team and make appointments. If you have questions, please call your primary care clinic.  If you do not have a primary care provider, please call [phone] and they will assist you.        Care EveryWhere ID     This is your Care EveryWhere ID. This could be used by other organizations to access your [facility] medical records  [id number]        Your Vitals Were     Pulse Temperature Height Last Period BMI (Body Mass Index)       72 98.9  F (37.2  C) (Tympanic) 5' 7.32\" (1.71 m) 06/27/2018 (Approximate) 22.96 kg/m2        Blood Pressure from Last 3 Encounters:   07/11/18 100/60   06/29/17 105/70   06/23/16 101/68    Weight from Last 3 Encounters:   07/11/18 148 lb (67.1 kg)   06/29/17 149 lb (67.6 kg)   06/23/16 144 lb (65.3 kg)              We Performed the Following     Comprehensive metabolic " panel     Lipid panel reflex to direct LDL Fasting     Pap imaged thin layer screen reflex to HPV if ASCUS - recommend age 25 - 29     TSH WITH FREE T4 REFLEX          Today's Medication Changes          These changes are accurate as of 7/11/18  9:43 AM.  If you have any questions, ask your nurse or doctor.               These medicines have changed or have updated prescriptions.        Dose/Directions    norgestim-eth estrad triphasic 0.18/0.215/0.25 MG-35 MCG per tablet   Commonly known as:  TRI-SPRINTEC   This may have changed:  See the new instructions.   Used for:  Encounter for annual physical exam, Oral contraceptive pill surveillance   Changed by:  Timbo Gabriel MD        Dose:  1 tablet   Take 1 tablet by mouth daily   Quantity:  90 tablet   Refills:  3            Where to get your medicines      These medications were sent to [facility] [address] [place] [address] [address] [address]     Phone:  [phone]     norgestim-eth estrad triphasic 0.18/0.215/0.25 MG-35 MCG per tablet                Primary Care Provider Office Phone # Fax #    Timbo Gabriel [phone]       [address]  [place] [address]        Equal Access to Services     [facility] AH: Hadii dwain gustafson hadasho Soomaali, waaxda luqadaha, qaybta kaalmada adeegyada, waxay gelacio bedoya. So [facility] [phone].    ATENCIÓN: Si habla español, tiene a verdin disposición servicios gratuitos de asistencia lingüística. Llame al [phone].    We comply with applicable federal civil rights laws and [place] laws. We do not discriminate on the basis of race, color, national origin, age, disability, sex, sexual orientation, or gender identity.            Thank you!     Thank you for choosing [facility] [place]  for your care. Our goal is always to provide you with excellent care. Hearing back from our patients is one way we can continue to  improve our services. Please take a few minutes to complete the written survey that you may receive in the mail after your visit with us. Thank you!             Your Updated Medication List - Protect others around you: Learn how to safely use, store and throw away your medicines at www.disposemymeds.org.          This list is accurate as of 7/11/18  9:43 AM.  Always use your most recent med list.                   Brand Name Dispense Instructions for use Diagnosis    BIOTIN PO      Take by mouth daily        clindamycin 1 % solution    CLEOCIN T    60 mL    Apply topically 2 times daily    Other acne       levothyroxine 150 MCG tablet    SYNTHROID/LEVOTHROID    90 tablet    TAKE 1 TABLET(150 MCG) BY MOUTH DAILY    Other specified hypothyroidism       methylphenidate ER 27 MG CR tablet    CONCERTA    30 tablet    Take 1 tablet (27 mg) by mouth every morning Wayne WOODY    Attention deficit disorder (ADD) without hyperactivity, Controlled substance agreement signed       norgestim-eth estrad triphasic 0.18/0.215/0.25 MG-35 MCG per tablet    TRI-SPRINTEC    90 tablet    Take 1 tablet by mouth daily    Encounter for annual physical exam, Oral contraceptive pill surveillance       OMEGA-3 FISH OIL PO      Take by mouth daily        tretinoin 0.025 % cream    RETIN-A     ISIDRO AA IN THE EVENING        VITAMIN D3 PO      Take by mouth daily

## 2018-07-11 NOTE — PROGRESS NOTES
SUBJECTIVE:   CC: Yancy Palumbo is an 28 year old woman who presents for preventive health visit.     Healthy Habits:    Do you get at least three servings of calcium containing foods daily (dairy, green leafy vegetables, etc.)? yes    Amount of exercise or daily activities, outside of work: 4-5 day(s) per week    Problems taking medications regularly No    Medication side effects: No    Have you had an eye exam in the past two years? no    Do you see a dentist twice per year? yes    Do you have sleep apnea, excessive snoring or daytime drowsiness?no    She is currently healthy.  She denies any current concerns.  She used to take ADD medication but she does not take it anymore.  She denies any chest pain shortness of breath.  She has history of hypothyroidism taking 150 MCG of levothyroxine.    Today's PHQ-2 Score:   PHQ-2 ( 1999 [employer]) 7/11/2018 6/29/2017   Q1: Little interest or pleasure in doing things 0 0   Q2: Feeling down, depressed or hopeless 0 0   PHQ-2 Score 0 0   Q1: Little interest or pleasure in doing things - -   Q2: Feeling down, depressed or hopeless - -   PHQ-2 Score - -       Abuse: Current or Past(Physical, Sexual or Emotional)- NO  Do you feel safe in your environment - Yes    Social History   Substance Use Topics     Smoking status: Never Smoker     Smokeless tobacco: Never Used     Alcohol use 1.2 oz/week     2 Standard drinks or equivalent per week     If you drink alcohol do you typically have >3 drinks per day or >7 drinks per week? No                     Reviewed orders with patient.  Reviewed health maintenance and updated orders accordingly - Yes      Mammogram not appropriate for this patient based on age.    Pertinent mammograms are reviewed under the imaging tab.  History of abnormal Pap smear: NO - age 21-29 PAP every 3 years recommended  PAP / HPV 7/1/2015 8/22/2012 8/9/2011   PAP NIL NIL NIL     Reviewed and updated as needed this visit by clinical staff  Tobacco  Allergies   "Meds  Fam Hx  Soc Hx        Reviewed and updated as needed this visit by Provider            ROS:  CONSTITUTIONAL: NEGATIVE for fever, chills, change in weight  INTEGUMENTARU/SKIN: NEGATIVE for worrisome rashes, moles or lesions  EYES: NEGATIVE for vision changes or irritation  ENT: NEGATIVE for ear, mouth and throat problems  RESP: NEGATIVE for significant cough or SOB  BREAST: NEGATIVE for masses, tenderness or discharge  CV: NEGATIVE for chest pain, palpitations or peripheral edema  GI: NEGATIVE for nausea, abdominal pain, heartburn, or change in bowel habits  : NEGATIVE for unusual urinary or vaginal symptoms. Periods are regular.  MUSCULOSKELETAL: NEGATIVE for significant arthralgias or myalgia  NEURO: NEGATIVE for weakness, dizziness or paresthesias  PSYCHIATRIC: NEGATIVE for changes in mood or affect    OBJECTIVE:   /60  Pulse 72  Temp 98.9  F (37.2  C) (Tympanic)  Ht 5' 7.32\" (1.71 m)  Wt 148 lb (67.1 kg)  LMP 06/27/2018 (Approximate)  BMI 22.96 kg/m2  EXAM:  GENERAL: healthy, alert and no distress  EYES: Eyes grossly normal to inspection, PERRL and conjunctivae and sclerae normal  HENT: ear canals and TM's normal, nose and mouth without ulcers or lesions  NECK: no adenopathy, no asymmetry, masses, or scars and thyroid normal to palpation  RESP: lungs clear to auscultation - no rales, rhonchi or wheezes  BREAST: normal without masses, tenderness or nipple discharge and no palpable axillary masses or adenopathy  CV: regular rate and rhythm, normal S1 S2, no S3 or S4, no murmur, click or rub, no peripheral edema and peripheral pulses strong  ABDOMEN: soft, nontender, no hepatosplenomegaly, no masses and bowel sounds normal   (female): normal female external genitalia, normal urethral meatus, vaginal mucosa pink, moist, well rugated, and normal cervix/adnexa/uterus without masses or discharge  MS: no gross musculoskeletal defects noted, no edema  SKIN: no suspicious lesions or rashes  NEURO: " "Normal strength and tone, mentation intact and speech normal  PSYCH: mentation appears normal, affect normal/bright    Diagnostic Test Results:  none     ASSESSMENT/PLAN:   1. Encounter for annual physical exam  Labs ordered including thyroid.  Once done we will follow-up on that and refill her thyroid medication.  OCP refill.  Side effects discussed.  - norgestim-eth estrad triphasic (TRI-SPRINTEC) 0.18/0.215/0.25 MG-35 MCG per tablet; Take 1 tablet by mouth daily  Dispense: 90 tablet; Refill: 3  - TSH WITH FREE T4 REFLEX  - Pap imaged thin layer screen reflex to HPV if ASCUS - recommend age 25 - 29  - Lipid panel reflex to direct LDL Fasting  - Comprehensive metabolic panel    2. Other specified hypothyroidism    - TSH WITH FREE T4 REFLEX    3. Screening for malignant neoplasm of cervix    - Pap imaged thin layer screen reflex to HPV if ASCUS - recommend age 25 - 29    4. CARDIOVASCULAR SCREENING; LDL GOAL LESS THAN 160    - Lipid panel reflex to direct LDL Fasting  - Comprehensive metabolic panel    5. Oral contraceptive pill surveillance    - norgestim-eth estrad triphasic (TRI-SPRINTEC) 0.18/0.215/0.25 MG-35 MCG per tablet; Take 1 tablet by mouth daily  Dispense: 90 tablet; Refill: 3    COUNSELING:   Reviewed preventive health counseling, as reflected in patient instructions       Healthy diet/nutrition       Vision screening    BP Readings from Last 1 Encounters:   07/11/18 100/60     Estimated body mass index is 22.96 kg/(m^2) as calculated from the following:    Height as of this encounter: 5' 7.32\" (1.71 m).    Weight as of this encounter: 148 lb (67.1 kg).           reports that she has never smoked. She has never used smokeless tobacco.      Counseling Resources:  ATP IV Guidelines  Pooled Cohorts Equation Calculator  Breast Cancer Risk Calculator  FRAX Risk Assessment  ICSI Preventive Guidelines  Dietary Guidelines for Americans, 2010  USDA's MyPlate  ASA Prophylaxis  Lung CA Screening    Timbo Gabriel, " MD  [facility] [place]

## 2018-07-13 LAB
COPATH REPORT: NORMAL
PAP: NORMAL

## 2019-03-27 DIAGNOSIS — Z30.41 ORAL CONTRACEPTIVE PILL SURVEILLANCE: ICD-10-CM

## 2019-03-27 DIAGNOSIS — Z00.00 ENCOUNTER FOR ANNUAL PHYSICAL EXAM: ICD-10-CM

## 2019-03-27 RX ORDER — NORGESTIMATE AND ETHINYL ESTRADIOL 7DAYSX3 28
KIT ORAL
Qty: 84 TABLET | Refills: 0 | OUTPATIENT
Start: 2019-03-27

## 2019-03-27 NOTE — TELEPHONE ENCOUNTER
Refill approved through [facility] protocol.  Ilene Syed RN  [facility]  [phone]

## 2019-03-27 NOTE — TELEPHONE ENCOUNTER
"Requested Prescriptions   Pending Prescriptions Disp Refills     TRI-SPRINTEC 0.18/0.215/0.25 MG-35 MCG tablet [Pharmacy Med Name: TRI-SPRINTEC TABLETS 28] 84 tablet 0     Sig: TAKE 1 TABLET BY MOUTH DAILY    Contraceptives Protocol Passed - 3/27/2019  9:20 AM       Passed - Patient is not a current smoker if age is 35 or older       Passed - Recent (12 mo) or future (30 days) visit within the authorizing provider's specialty    Patient had office visit in the last 12 months or has a visit in the next 30 days with authorizing provider or within the authorizing provider's specialty.  See \"Patient Info\" tab in inbasket, or \"Choose Columns\" in Meds & Orders section of the refill encounter.         Last Written Prescription Date:  7/11/2018  Last Fill Quantity: 90,  # refills: 3   Last office visit: 7/11/2018 with prescribing provider:  DANY Gabriel  Future Office Visit:        Passed - Medication is active on med list       Passed - No active pregnancy on record       Passed - No positive pregnancy test in past 12 months          "

## 2019-06-21 DIAGNOSIS — E03.8 OTHER SPECIFIED HYPOTHYROIDISM: ICD-10-CM

## 2019-06-21 RX ORDER — LEVOTHYROXINE SODIUM 150 UG/1
TABLET ORAL
Qty: 30 TABLET | Refills: 0 | Status: SHIPPED | OUTPATIENT
Start: 2019-06-21 | End: 2019-07-11

## 2019-06-21 NOTE — TELEPHONE ENCOUNTER
"Last Written Prescription Date:  7/11/18  Last Fill Quantity: 90,  # refills: 3   Last office visit: 7/11/2018 with prescribing provider:     Future Office Visit:   Next 5 appointments (look out 90 days)    Jul 10, 2019  9:00 AM CDT  [facility] with Timbo Gabriel MD  [facility] ([facility]) [address] [place] [id number] [phone]         Requested Prescriptions   Pending Prescriptions Disp Refills     levothyroxine (SYNTHROID/LEVOTHROID) 150 MCG tablet [Pharmacy Med Name: LEVOTHYROXINE 0.150MG (150MCG) TAB] 90 tablet 0     Sig: TAKE 1 TABLET(150 MCG) BY MOUTH DAILY       Thyroid Protocol Passed - 6/21/2019  9:55 AM        Passed - Patient is 12 years or older        Passed - Recent (12 mo) or future (30 days) visit within the authorizing provider's specialty     Patient had office visit in the last 12 months or has a visit in the next 30 days with authorizing provider or within the authorizing provider's specialty.  See \"Patient Info\" tab in inbasket, or \"Choose Columns\" in Meds & Orders section of the refill encounter.              Passed - Medication is active on med list        Passed - Normal TSH on file in past 12 months     Recent Labs   Lab Test 07/11/18  0920   TSH 2.09              Passed - No active pregnancy on record     If patient is pregnant or has had a positive pregnancy test, please check TSH.          Passed - No positive pregnancy test in past 12 months     If patient is pregnant or has had a positive pregnancy test, please check TSH.            "

## 2019-07-10 ENCOUNTER — OFFICE VISIT (OUTPATIENT)
Dept: FAMILY MEDICINE | Facility: CLINIC | Age: 29
End: 2019-07-10
Payer: COMMERCIAL

## 2019-07-10 VITALS
TEMPERATURE: 98.5 F | WEIGHT: 153 LBS | HEIGHT: 68 IN | BODY MASS INDEX: 23.19 KG/M2 | HEART RATE: 56 BPM | SYSTOLIC BLOOD PRESSURE: 94 MMHG | DIASTOLIC BLOOD PRESSURE: 60 MMHG

## 2019-07-10 DIAGNOSIS — Z23 NEED FOR VACCINATION: Primary | ICD-10-CM

## 2019-07-10 DIAGNOSIS — E03.8 OTHER SPECIFIED HYPOTHYROIDISM: ICD-10-CM

## 2019-07-10 DIAGNOSIS — Z00.00 ENCOUNTER FOR ANNUAL PHYSICAL EXAM: ICD-10-CM

## 2019-07-10 DIAGNOSIS — Z13.6 CARDIOVASCULAR SCREENING; LDL GOAL LESS THAN 160: ICD-10-CM

## 2019-07-10 DIAGNOSIS — Z30.41 ORAL CONTRACEPTIVE PILL SURVEILLANCE: ICD-10-CM

## 2019-07-10 PROCEDURE — 80061 LIPID PANEL: CPT | Performed by: FAMILY MEDICINE

## 2019-07-10 PROCEDURE — 90471 IMMUNIZATION ADMIN: CPT | Performed by: FAMILY MEDICINE

## 2019-07-10 PROCEDURE — 36415 COLL VENOUS BLD VENIPUNCTURE: CPT | Performed by: FAMILY MEDICINE

## 2019-07-10 PROCEDURE — 90714 TD VACC NO PRESV 7 YRS+ IM: CPT | Performed by: FAMILY MEDICINE

## 2019-07-10 PROCEDURE — 99395 PREV VISIT EST AGE 18-39: CPT | Mod: 25 | Performed by: FAMILY MEDICINE

## 2019-07-10 PROCEDURE — 84443 ASSAY THYROID STIM HORMONE: CPT | Performed by: FAMILY MEDICINE

## 2019-07-10 PROCEDURE — 80053 COMPREHEN METABOLIC PANEL: CPT | Performed by: FAMILY MEDICINE

## 2019-07-10 RX ORDER — NORGESTIMATE AND ETHINYL ESTRADIOL 7DAYSX3 28
1 KIT ORAL DAILY
Qty: 90 TABLET | Refills: 3 | Status: SHIPPED | OUTPATIENT
Start: 2019-07-10 | End: 2020-06-03

## 2019-07-10 RX ORDER — LEVOTHYROXINE SODIUM 150 UG/1
150 TABLET ORAL DAILY
Qty: 30 TABLET | Refills: 0 | Status: CANCELLED | OUTPATIENT
Start: 2019-07-10

## 2019-07-10 ASSESSMENT — MIFFLIN-ST. JEOR: SCORE: 1462.5

## 2019-07-10 NOTE — PROGRESS NOTES
SUBJECTIVE:   CC: Yancy Palumbo is an 29 year old woman who presents for preventive health visit.     Healthy Habits:     Getting at least 3 servings of Calcium per day:  Yes    Bi-annual eye exam:  NO    Dental care twice a year:  Yes    Sleep apnea or symptoms of sleep apnea:  None    Diet:  Regular (no restrictions) and Other    Frequency of exercise:  4-5 days/week    Duration of exercise:  30-45 minutes    Taking medications regularly:  Yes    Medication side effects:  Not applicable    PHQ-2 Total Score: 0    Additional concerns today:  No    She lives in [place] but visit [place] as her family is living here.  Overall feels normal.  No symptoms of hypothyroidism currently on 150 MCG of levothyroxine.  No other concerns including STD.  She is on birth control and she is tolerating it without any difficulty.      Hypothyroidism Follow-up      Since last visit, patient describes the following symptoms: Weight stable, no hair loss, no skin changes, no constipation, no loose stools      Today's PHQ-2 Score:   PHQ-2 ( 1999 Pfizer) 7/9/2019   Q1: Little interest or pleasure in doing things 0   Q2: Feeling down, depressed or hopeless 0   PHQ-2 Score 0   Q1: Little interest or pleasure in doing things Not at all   Q2: Feeling down, depressed or hopeless Not at all   PHQ-2 Score 0       Abuse: Current or Past(Physical, Sexual or Emotional)- No  Do you feel safe in your environment? YES    Social History     Tobacco Use     Smoking status: Never Smoker     Smokeless tobacco: Never Used   Substance Use Topics     Alcohol use: Yes     Alcohol/week: 1.2 oz     Types: 2 Standard drinks or equivalent per week         Alcohol Use 7/9/2019   Prescreen: >3 drinks/day or >7 drinks/week? No   Prescreen: >3 drinks/day or >7 drinks/week? -       Reviewed orders with patient.  Reviewed health maintenance and updated orders accordingly - Yes      Mammogram not appropriate for this patient based on age.    Pertinent mammograms  "are reviewed under the imaging tab.  History of abnormal Pap smear: NO - age 30- 65 PAP every 3 years recommended  PAP / HPV 7/11/2018 7/1/2015 8/22/2012   PAP NIL NIL NIL     Reviewed and updated as needed this visit by clinical staff  Tobacco  Allergies  Meds  Fam Hx  Soc Hx        Reviewed and updated as needed this visit by Provider            Review of Systems  CONSTITUTIONAL: NEGATIVE for fever, chills, change in weight  INTEGUMENTARU/SKIN: NEGATIVE for worrisome rashes, moles or lesions  EYES: NEGATIVE for vision changes or irritation  ENT: NEGATIVE for ear, mouth and throat problems  RESP: NEGATIVE for significant cough or SOB  BREAST: NEGATIVE for masses, tenderness or discharge  CV: NEGATIVE for chest pain, palpitations or peripheral edema  GI: NEGATIVE for nausea, abdominal pain, heartburn, or change in bowel habits  : NEGATIVE for unusual urinary or vaginal symptoms. Periods are regular.  MUSCULOSKELETAL: NEGATIVE for significant arthralgias or myalgia  NEURO: NEGATIVE for weakness, dizziness or paresthesias  PSYCHIATRIC: NEGATIVE for changes in mood or affect     OBJECTIVE:   Ht 1.727 m (5' 8\")   Wt 69.4 kg (153 lb)   LMP 06/25/2019 (Approximate)   BMI 23.26 kg/m    Physical Exam  GENERAL: healthy, alert and no distress  EYES: Eyes grossly normal to inspection, PERRL and conjunctivae and sclerae normal  HENT: ear canals and TM's normal, nose and mouth without ulcers or lesions  NECK: no adenopathy, no asymmetry, masses, or scars and thyroid normal to palpation  RESP: lungs clear to auscultation - no rales, rhonchi or wheezes  CV: regular rate and rhythm, normal S1 S2, no S3 or S4, no murmur, click or rub, no peripheral edema and peripheral pulses strong  ABDOMEN: soft, nontender, no hepatosplenomegaly, no masses and bowel sounds normal  MS: no gross musculoskeletal defects noted, no edema  SKIN: no suspicious lesions or rashes  NEURO: Normal strength and tone, mentation intact and speech " "normal  PSYCH: mentation appears normal, affect normal/bright    Diagnostic Test Results:  Labs reviewed in Epic    ASSESSMENT/PLAN:   1. Other specified hypothyroidism  And medication once labs reviewed.  Plan 150 MCG of levothyroxine  - TSH with free T4 reflex    2. Encounter for annual physical exam    - norgestim-eth estrad triphasic (TRI-SPRINTEC) 0.18/0.215/0.25 MG-35 MCG tablet; Take 1 tablet by mouth daily  Dispense: 90 tablet; Refill: 3  - Lipid panel reflex to direct LDL Fasting    3. Oral contraceptive pill surveillance    - norgestim-eth estrad triphasic (TRI-SPRINTEC) 0.18/0.215/0.25 MG-35 MCG tablet; Take 1 tablet by mouth daily  Dispense: 90 tablet; Refill: 3    4. Need for vaccination    - 1st  Administration  [45611]    5. CARDIOVASCULAR SCREENING; LDL GOAL LESS THAN 160    - Comprehensive metabolic panel  - Lipid panel reflex to direct LDL Fasting    COUNSELING:  Reviewed preventive health counseling, as reflected in patient instructions       Healthy diet/nutrition       Vision screening    Estimated body mass index is 23.26 kg/m  as calculated from the following:    Height as of this encounter: 1.727 m (5' 8\").    Weight as of this encounter: 69.4 kg (153 lb).         reports that she has never smoked. She has never used smokeless tobacco.      Counseling Resources:  ATP IV Guidelines  Pooled Cohorts Equation Calculator  Breast Cancer Risk Calculator  FRAX Risk Assessment  ICSI Preventive Guidelines  Dietary Guidelines for Americans, 2010  [employer]'s MyPlate  ASA Prophylaxis  Lung CA Screening    Timbo Gabriel MD  [facility]  "

## 2019-07-11 LAB
ALBUMIN SERPL-MCNC: 3.4 G/DL (ref 3.4–5)
ALP SERPL-CCNC: 46 U/L (ref 40–150)
ALT SERPL W P-5'-P-CCNC: 13 U/L (ref 0–50)
ANION GAP SERPL CALCULATED.3IONS-SCNC: 6 MMOL/L (ref 3–14)
AST SERPL W P-5'-P-CCNC: 11 U/L (ref 0–45)
BILIRUB SERPL-MCNC: 0.4 MG/DL (ref 0.2–1.3)
BUN SERPL-MCNC: 12 MG/DL (ref 7–30)
CALCIUM SERPL-MCNC: 8.3 MG/DL (ref 8.5–10.1)
CHLORIDE SERPL-SCNC: 108 MMOL/L (ref 94–109)
CHOLEST SERPL-MCNC: 169 MG/DL
CO2 SERPL-SCNC: 24 MMOL/L (ref 20–32)
CREAT SERPL-MCNC: 0.66 MG/DL (ref 0.52–1.04)
GFR SERPL CREATININE-BSD FRML MDRD: >90 ML/MIN/{1.73_M2}
GLUCOSE SERPL-MCNC: 80 MG/DL (ref 70–99)
HDLC SERPL-MCNC: 60 MG/DL
LDLC SERPL CALC-MCNC: 92 MG/DL
NONHDLC SERPL-MCNC: 109 MG/DL
POTASSIUM SERPL-SCNC: 4 MMOL/L (ref 3.4–5.3)
PROT SERPL-MCNC: 7 G/DL (ref 6.8–8.8)
SODIUM SERPL-SCNC: 138 MMOL/L (ref 133–144)
TRIGL SERPL-MCNC: 84 MG/DL
TSH SERPL DL<=0.005 MIU/L-ACNC: 0.58 MU/L (ref 0.4–4)

## 2019-07-11 RX ORDER — LEVOTHYROXINE SODIUM 125 UG/1
125 TABLET ORAL DAILY
Qty: 90 TABLET | Refills: 3 | Status: SHIPPED | OUTPATIENT
Start: 2019-07-11

## 2019-10-30 DIAGNOSIS — E03.8 OTHER SPECIFIED HYPOTHYROIDISM: ICD-10-CM

## 2019-10-30 RX ORDER — LEVOTHYROXINE SODIUM 150 UG/1
TABLET ORAL
Qty: 30 TABLET | Refills: 0 | OUTPATIENT
Start: 2019-10-30

## 2019-10-30 NOTE — TELEPHONE ENCOUNTER
"Requested Prescriptions   Pending Prescriptions Disp Refills     levothyroxine (SYNTHROID/LEVOTHROID) 150 MCG tablet [Pharmacy Med Name: LEVOTHYROXINE 0.150MG (150MCG) TAB] 30 tablet 0     Sig: TAKE 1 TABLET BY MOUTH EVERY DAY  Last Written Prescription Date:  7/11/19  Last Fill Quantity: 90,  # refills: 3   Last office visit: 7/10/2019 with prescribing provider:  Hravey   Future Office Visit:           Thyroid Protocol Passed - 10/30/2019  2:20 AM        Passed - Patient is 12 years or older        Passed - Recent (12 mo) or future (30 days) visit within the authorizing provider's specialty     Patient has had an office visit with the authorizing provider or a provider within the authorizing providers department within the previous 12 mos or has a future within next 30 days. See \"Patient Info\" tab in inbasket, or \"Choose Columns\" in Meds & Orders section of the refill encounter.              Passed - Medication is active on med list        Passed - Normal TSH on file in past 12 months     Recent Labs   Lab Test 07/10/19  0922   TSH 0.58              Passed - No active pregnancy on record     If patient is pregnant or has had a positive pregnancy test, please check TSH.          Passed - No positive pregnancy test in past 12 months     If patient is pregnant or has had a positive pregnancy test, please check TSH.            "

## 2020-06-03 DIAGNOSIS — Z00.00 ENCOUNTER FOR ANNUAL PHYSICAL EXAM: ICD-10-CM

## 2020-06-03 DIAGNOSIS — Z30.41 ORAL CONTRACEPTIVE PILL SURVEILLANCE: ICD-10-CM

## 2020-06-03 RX ORDER — NORGESTIMATE AND ETHINYL ESTRADIOL 7DAYSX3 28
KIT ORAL
Qty: 84 TABLET | Refills: 0 | Status: SHIPPED | OUTPATIENT
Start: 2020-06-03

## 2020-06-03 NOTE — TELEPHONE ENCOUNTER
Medication is being filled for 1 time refill only due to:  Patient is due for virutal visit in July.      Patient is due in 1 month for office visit . Last office visit was July 2019. Routing to team to inform and assist with scheduling virtual visit with provider. Once patient is scheduled for visit, route back to triage to address refill. Thank you very much.    Gardenia Clay RN, BSN  [facility]

## 2020-06-09 NOTE — TELEPHONE ENCOUNTER
Jenny Radford contacted Yanyc on 06/09/20 and left a message. If patient calls back please schedule appointment as soon as possible for a med check.      .Jenny WILLIAM    [facility] Alyse Spal[facility]

## 2021-12-25 ENCOUNTER — OFFICE VISIT (OUTPATIENT)
Dept: URGENT CARE | Facility: URGENT CARE | Age: 31
End: 2021-12-25
Payer: COMMERCIAL

## 2021-12-25 VITALS
RESPIRATION RATE: 16 BRPM | DIASTOLIC BLOOD PRESSURE: 71 MMHG | SYSTOLIC BLOOD PRESSURE: 111 MMHG | OXYGEN SATURATION: 97 % | TEMPERATURE: 99.1 F | HEART RATE: 72 BPM

## 2021-12-25 DIAGNOSIS — J06.9 VIRAL URI: Primary | ICD-10-CM

## 2021-12-25 DIAGNOSIS — R07.0 THROAT PAIN: ICD-10-CM

## 2021-12-25 LAB
DEPRECATED S PYO AG THROAT QL EIA: NEGATIVE
GROUP A STREP BY PCR: NOT DETECTED

## 2021-12-25 PROCEDURE — U0003 INFECTIOUS AGENT DETECTION BY NUCLEIC ACID (DNA OR RNA); SEVERE ACUTE RESPIRATORY SYNDROME CORONAVIRUS 2 (SARS-COV-2) (CORONAVIRUS DISEASE [COVID-19]), AMPLIFIED PROBE TECHNIQUE, MAKING USE OF HIGH THROUGHPUT TECHNOLOGIES AS DESCRIBED BY CMS-2020-01-R: HCPCS | Performed by: FAMILY MEDICINE

## 2021-12-25 PROCEDURE — 99213 OFFICE O/P EST LOW 20 MIN: CPT | Performed by: FAMILY MEDICINE

## 2021-12-25 PROCEDURE — 87651 STREP A DNA AMP PROBE: CPT | Performed by: FAMILY MEDICINE

## 2021-12-25 PROCEDURE — U0005 INFEC AGEN DETEC AMPLI PROBE: HCPCS | Performed by: FAMILY MEDICINE

## 2021-12-25 NOTE — PATIENT INSTRUCTIONS
Patient Education     Viral Upper Respiratory Illness (Adult)    You have a viral upper respiratory illness (URI), which is another term for the common cold. This illness is contagious during the first few days. It is spread through the air by coughing and sneezing. It may also be spread by direct contact (touching the sick person and then touching your own eyes, nose, or mouth). Frequent handwashing will decrease risk of spread. Most viral illnesses go away within 7 to 10 days with rest and simple home remedies. Sometimes the illness may last for several weeks. Antibiotics will not kill a virus, and they are generally not prescribed for this condition.  Home care    If symptoms are severe, rest at home for the first 2 to 3 days. When you resume activity, don't let yourself get too tired.    Don't smoke. If you need help stopping, talk with your healthcare provider.    Avoid being exposed to cigarette smoke (yours or others ).    You may use acetaminophen or ibuprofen to control pain and fever, unless another medicine was prescribed. If you have chronic liver or kidney disease, have ever had a stomach ulcer or gastrointestinal bleeding, or are taking blood-thinning medicines, talk with your healthcare provider before using these medicines. Aspirin should never be given to anyone under 18 years of age who is ill with a viral infection or fever. It may cause severe liver or brain damage.    Your appetite may be poor, so a light diet is fine. Stay well hydrated by drinking 6 to 8 glasses of fluids per day (water, soft drinks, juices, tea, or soup). Extra fluids will help loosen secretions in the nose and lungs.    Over-the-counter cold medicines will not shorten the length of time you re sick, but they may be helpful for the following symptoms: cough, sore throat, and nasal and sinus congestion. If you take prescription medicines, ask your healthcare provider or pharmacist which over-the-counter medicines are safe to  use. (Note: Don't use decongestants if you have high blood pressure.)  Follow-up care  Follow up with your healthcare provider, or as advised.  When to seek medical advice  Call your healthcare provider right away if any of these occur:    Cough with lots of colored sputum (mucus)    Severe headache; face, neck, or ear pain    Difficulty swallowing due to throat pain    Fever of 100.4 F (38 C) or higher, or as directed by your healthcare provider  Call 911  Call 911 if any of these occur:    Chest pain, shortness of breath, wheezing, or difficulty breathing    Coughing up blood    Very severe pain with swallowing, especially if it goes along with a muffled voice   [employer] last reviewed this educational content on 6/1/2018 2000-2021 [employer]. All rights reserved. This information is not intended as a substitute for professional medical care. Always follow your healthcare professional's instructions.                   December 25, 2021 [facility] Plan:      At this time, your cold symptoms appear to be caused by a virus. You may have a common cold virus. It is also possible you could have a Covid-19 virus.      A Covid-19 PCR test was done here today.  The result typically comes back in 1-2 days (watch your MyChart or call the urgent care clinic for your result in 1-2 days).     A quick Strep test was done here today (the result was negative/normal). Another Strep PCR test was done here today. Please watch your MyChart for that result (it typically comes back in 1-2 days).      Please continue with home comfort care measures (including as needed Tylenol or Ibuprofen for sore throat and fever) and extra rest and fluids.     Follow-up immediately if you have any sudden, severe, worsening of your symptoms or if you develop any of the below:         Chest pain, shortness of breath, wheezing, or difficulty breathing    Coughing up blood    Very severe pain with swallowing, especially if it goes  along with a muffled voice        Please stay home/self-isolate (no contact with others outside of home) until your Covid-19 test result is back (this typically takes 1-2 days), you are without fever for 24 hours (without use of fever reducing medication) and your symptoms are improving.       Please contact your work supervisor regarding their return to work policy when you have acute illness symptoms and you have had a Covid-19 test (with result pending).

## 2021-12-25 NOTE — LETTER
[facility]  [address] [address]  [phone]      December 25, 2021    RE:  Yancy SUMAN Palumbo                                                                                                                                                       [address] [facility] [address]  [facility] [address]            To whom it may concern:    Yancy SUMAN Palumbo is under my professional care for    Viral URI  Throat pain.   Testing for Covid 19 has been ordered.  If the covid test is positive will need to quarantine at least 10 days and may return to work/ school if fever free at least 1 day.  If the covid test is negative may return to work/ school if symptom free      Sincerely,        Rosanna Ayoub MD    [facility]

## 2021-12-25 NOTE — PROGRESS NOTES
ASSESSMENT/  PLAN:         Throat pain     - Streptococcus A Rapid Screen w/Reflex to PCR - Clinic Collect  - Symptomatic; Unknown COVID-19 Virus (Coronavirus) by PCR Nose  - Group A Streptococcus PCR Throat Swab    Viral URI      discussed with the patient that a confirmatory strep culture will be performed and that she will be called if the culture is positive for strep.  We discussed other possible causes of pharyngitis including cold viruses , Covid 19      If the test for Covid 19 is positive will need to quarantine at least 10 days and may return to work/ school if fever free at least 1 day        Symptomatic treat with gargles, lozenges, and OTC analgesic as needed. Follow-up with primary clinic if not improving.    Note for work    --------------------------------------------------------------------------------------------------------------------------------    SUBJECTIVE:     Yancy Palumbo is a 31 year old female   with a chief complaint of sore throat.  Onset of symptoms was 2 day(s) ago.    Course of illness: sudden onset, still present and constant.  Severity moderate  Current and Associated symptoms: sore throat  Treatment measures tried include Tylenol/Ibuprofen.  Predisposing factors include -  Her partner took a home covid test- result was equivocal, maybe positive-   Would like PCR test    Needs note for airline/ work.    Past Medical History:   Diagnosis Date     ALCOHOL ABUSE, EPISODIC 8/2011    taken to detox for intoxication     Attention deficit disorder without mention of hyperactivity      Generalized anxiety disorder 2004    resolved 2015     Hypothyroidism 2000    age 10      Infectious mononucleosis 2009     Major depressive disorder, single episode in full remission (H)     resolved 2015     Patient Active Problem List   Diagnosis     CARDIOVASCULAR SCREENING; LDL GOAL LESS THAN 160     Attention deficit disorder (ADD) without hyperactivity     Oral contraceptive pill surveillance      Acquired hypothyroidism         ALLERGIES:  No known allergies    MEDs  BIOTIN PO, Take by mouth daily  Cholecalciferol (VITAMIN D3 PO), Take by mouth daily  clindamycin (CLEOCIN T) 1 % external solution, Apply topically 2 times daily  levothyroxine (SYNTHROID/LEVOTHROID) 125 MCG tablet, Take 1 tablet (125 mcg) by mouth daily  Omega-3 Fatty Acids (OMEGA-3 FISH OIL PO), Take by mouth daily  tretinoin (RETIN-A) 0.025 % cream, ISIDRO AA IN THE EVENING  TRI-SPRINTEC 0.18/0.215/0.25 MG-35 MCG tablet, TAKE 1 TABLET BY MOUTH DAILY    No current facility-administered medications on file prior to visit.      Social History     Tobacco Use     Smoking status: Never Smoker     Smokeless tobacco: Never Used   Substance Use Topics     Alcohol use: Yes     Alcohol/week: 2.0 standard drinks     Types: 2 Standard drinks or equivalent per week       Family History   Problem Relation Age of Onset     Thyroid Disease Mother         hypothyroidism     Rheumatoid Arthritis Mother      Thyroid Disease Father         hypothyroidism     Thyroid Disease Maternal Grandmother         hypothyroidism     Lung Cancer Maternal Grandmother      Colon Cancer Maternal Grandmother      Rheumatoid Arthritis Maternal Grandfather      Diabetes Paternal Grandmother         type 2     Cerebrovascular Disease Paternal Grandmother          age 72     Psychotic Disorder Maternal Uncle         2 uncles with schizophrenia.     Breast Cancer Paternal Aunt         onset age 40s         ROS:  CONSTITUTIONAL:NEGATIVE for fever, chills,    INTEGUMENTARY/SKIN: NEGATIVE for worrisome rashes,  or lesions  EYES: NEGATIVE for vision changes or irritation  RESP:NEGATIVE for significant cough or SOB  GI: NEGATIVE for nausea, abdominal pain,  , or change in bowel habits    OBJECTIVE:   /71   Pulse 72   Temp 99.1  F (37.3  C)   Resp 16   SpO2 97%   GENERAL APPEARANCE: alert, no distress and cooperative  EYES: EOMI,  PERRL, conjunctiva clear  HENT: ear canals  and TM's normal.  Nose normal.  Pharynx erythematous with some exudate noted.  NECK: supple, non-tender to palpation, no adenopathy noted  RESP: lungs clear to auscultation - no rales, rhonchi or wheezes  CV: regular rates and rhythm, normal S1 S2, no murmur noted  ABDOMEN:  soft, nontender, no HSM or masses and bowel sounds normal  SKIN: no suspicious lesions or rashes    Results for orders placed or performed in visit on 12/25/21   Streptococcus A Rapid Screen w/Reflex to PCR - Clinic Collect     Status: Normal    Specimen: Throat; Swab   Result Value Ref Range    Group A Strep antigen Negative Negative

## 2021-12-26 LAB — SARS-COV-2 RNA RESP QL NAA+PROBE: POSITIVE
